# Patient Record
(demographics unavailable — no encounter records)

---

## 2025-05-09 NOTE — ASSESSMENT
[FreeTextEntry1] : 1. Hashimoto's Current thyroid regimen:  Malin Thyroid 120mg on Wednesday and Sunday and Malin Thyroid 90mg all other days of the week Last labs from 4/28/25 with TSH of 16.34 and normal FT4 and TT3 Endorses continued weight gain, which continues to likely be multifactorial, but also may be related to persisting TSH above goal Reports that AT has not been covered, so paying out of pocket.  Reports to h/o intolerance to generic levothyroixine.  She continues regimen as above and has been very hesitant for dose adjustment.  -- recommend increasing to AT 120mg 3x/weekly with 90mg the other 4 days x1-2 weeks and then increasing to AT 120mg 4x/weekly with 90mg the other 3 days x1-2 weeks and then again to AT 120mg 5x/weekly with 90mg the other 2 days until repeat labs in ~3-4 months, given desire for slow titration  2. HLD Reviewed cholesterol guidelines    Follow up in ~4 months  Danyelle Rivera MS, FNP-BC, [facility] 05/09/2025

## 2025-05-09 NOTE — HISTORY OF PRESENT ILLNESS
[FreeTextEntry1] : Ms. POP is a 52 year old female with pmhx of hypothyroidism, vitamin D who presents for follow-up.  Previously following Dr Vicente, last (initial) visit, 4/8/22 Last visit with myself-- 04/25/2024  Interval change: Labs from 4/25/25 reviewed with patient today, as below Since last visit, recommended AT regimen adjustmend to alternate 120/90 vs 120mg daily following January labs with TSH above goal (16.59), but patient preferred continuation of regimen, as below Paying OOP for AT, disinterested in trial of alternative therapies recommended by formulary from insurance Endorses weight gain-- 177/178lb Endorses less physical activity when in [place], more walking when abroad or in [place] Desires to wean off of medication. Inquiring of ways aside for medication to revive thyroid gland   Current regimen: -- [place] Thyroid 120mg 2x/weekly, Wednesday and Sunday -- [place]ra Thyroid 90mg on other 5 days of the week  1. Hashimoto's Dx in 2008 Started on AT at that time.   In 2009, temporarily transitioned to levothyroxine monotherapy "it was a disaster"

## 2025-05-09 NOTE — REVIEW OF SYSTEMS
[As Noted in HPI] : as noted in HPI [Fatigue] : no fatigue [Recent Weight Gain (___ Lbs)] : recent weight gain: [unfilled] lbs [Constipation] : no constipation [Diarrhea] : no diarrhea [Polyuria] : no polyuria [Cold Intolerance] : no cold intolerance [Heat Intolerance] : no heat intolerance [Negative] : Musculoskeletal